# Patient Record
Sex: FEMALE | Race: BLACK OR AFRICAN AMERICAN
[De-identification: names, ages, dates, MRNs, and addresses within clinical notes are randomized per-mention and may not be internally consistent; named-entity substitution may affect disease eponyms.]

---

## 2020-10-15 ENCOUNTER — HOSPITAL ENCOUNTER (INPATIENT)
Dept: HOSPITAL 74 - J2C | Age: 35
LOS: 2 days | Discharge: HOME | DRG: 743 | End: 2020-10-17
Attending: OBSTETRICS & GYNECOLOGY | Admitting: OBSTETRICS & GYNECOLOGY
Payer: COMMERCIAL

## 2020-10-15 VITALS — BODY MASS INDEX: 28.1 KG/M2

## 2020-10-15 DIAGNOSIS — D25.9: Primary | ICD-10-CM

## 2020-10-15 LAB
ANION GAP SERPL CALC-SCNC: 7 MMOL/L (ref 8–16)
BUN SERPL-MCNC: 10.1 MG/DL (ref 7–18)
CALCIUM SERPL-MCNC: 8.4 MG/DL (ref 8.5–10.1)
CHLORIDE SERPL-SCNC: 106 MMOL/L (ref 98–107)
CO2 SERPL-SCNC: 26 MMOL/L (ref 21–32)
CREAT SERPL-MCNC: 0.8 MG/DL (ref 0.55–1.3)
DEPRECATED RDW RBC AUTO: 13.7 % (ref 11.6–15.6)
GLUCOSE SERPL-MCNC: 107 MG/DL (ref 74–106)
HCT VFR BLD CALC: 34.2 % (ref 32.4–45.2)
HGB BLD-MCNC: 11.5 GM/DL (ref 10.7–15.3)
MCH RBC QN AUTO: 32.3 PG (ref 25.7–33.7)
MCHC RBC AUTO-ENTMCNC: 33.7 G/DL (ref 32–36)
MCV RBC: 95.8 FL (ref 80–96)
PLATELET # BLD AUTO: 211 K/MM3 (ref 134–434)
PMV BLD: 9.4 FL (ref 7.5–11.1)
POTASSIUM SERPLBLD-SCNC: 4 MMOL/L (ref 3.5–5.1)
RBC # BLD AUTO: 3.57 M/MM3 (ref 3.6–5.2)
SODIUM SERPL-SCNC: 139 MMOL/L (ref 136–145)
WBC # BLD AUTO: 8.8 K/MM3 (ref 4–10)

## 2020-10-15 PROCEDURE — 0UB90ZZ EXCISION OF UTERUS, OPEN APPROACH: ICD-10-PCS | Performed by: OBSTETRICS & GYNECOLOGY

## 2020-10-15 RX ADMIN — SIMETHICONE CHEW TAB 80 MG PRN MG: 80 TABLET ORAL at 21:12

## 2020-10-15 RX ADMIN — SODIUM CHLORIDE, POTASSIUM CHLORIDE, SODIUM LACTATE AND CALCIUM CHLORIDE SCH MLS/HR: 600; 310; 30; 20 INJECTION, SOLUTION INTRAVENOUS at 21:15

## 2020-10-15 RX ADMIN — CEFAZOLIN SODIUM SCH MLS/HR: 1 INJECTION, SOLUTION INTRAVENOUS at 18:42

## 2020-10-15 RX ADMIN — IBUPROFEN PRN MG: 800 INJECTION INTRAVENOUS at 16:19

## 2020-10-15 RX ADMIN — SODIUM CHLORIDE, POTASSIUM CHLORIDE, SODIUM LACTATE AND CALCIUM CHLORIDE SCH MLS/HR: 600; 310; 30; 20 INJECTION, SOLUTION INTRAVENOUS at 13:00

## 2020-10-15 NOTE — OP
Operative Note





- Note:


Operative Date: 10/15/20


Pre-Operative Diagnosis: Leiomyomatous Uterus.  Abdominal Pain


Operation: Abdominal myomectomy.  Enterolysis


Post-Operative Diagnosis: Same as Pre-op


Surgeon: Luz Rausch


Assistant: Mario Blankenship


Anesthesia: General


Specimens Removed: 12 cm myoma.  2 x 3 cm.  1 x 4 cm.  1x 5 cm


Estimated Blood Loss (mls): 200


Operative Report Dictated: Yes

## 2020-10-16 LAB
ANION GAP SERPL CALC-SCNC: 8 MMOL/L (ref 8–16)
BUN SERPL-MCNC: 8.2 MG/DL (ref 7–18)
CALCIUM SERPL-MCNC: 8.2 MG/DL (ref 8.5–10.1)
CHLORIDE SERPL-SCNC: 108 MMOL/L (ref 98–107)
CO2 SERPL-SCNC: 26 MMOL/L (ref 21–32)
CREAT SERPL-MCNC: 0.7 MG/DL (ref 0.55–1.3)
DEPRECATED RDW RBC AUTO: 14 % (ref 11.6–15.6)
GLUCOSE SERPL-MCNC: 88 MG/DL (ref 74–106)
HCT VFR BLD CALC: 31 % (ref 32.4–45.2)
HGB BLD-MCNC: 10.4 GM/DL (ref 10.7–15.3)
MCH RBC QN AUTO: 32.1 PG (ref 25.7–33.7)
MCHC RBC AUTO-ENTMCNC: 33.7 G/DL (ref 32–36)
MCV RBC: 95.1 FL (ref 80–96)
PLATELET # BLD AUTO: 179 K/MM3 (ref 134–434)
PMV BLD: 9.6 FL (ref 7.5–11.1)
POTASSIUM SERPLBLD-SCNC: 3.5 MMOL/L (ref 3.5–5.1)
RBC # BLD AUTO: 3.25 M/MM3 (ref 3.6–5.2)
SODIUM SERPL-SCNC: 141 MMOL/L (ref 136–145)
WBC # BLD AUTO: 6.6 K/MM3 (ref 4–10)

## 2020-10-16 RX ADMIN — ACETAMINOPHEN SCH MG: 325 TABLET ORAL at 18:36

## 2020-10-16 RX ADMIN — SIMETHICONE CHEW TAB 80 MG PRN MG: 80 TABLET ORAL at 06:04

## 2020-10-16 RX ADMIN — CEFAZOLIN SODIUM SCH MLS/HR: 1 INJECTION, SOLUTION INTRAVENOUS at 10:36

## 2020-10-16 RX ADMIN — ACETAMINOPHEN SCH MG: 325 TABLET ORAL at 10:30

## 2020-10-16 RX ADMIN — ACETAMINOPHEN SCH MG: 325 TABLET ORAL at 14:35

## 2020-10-16 RX ADMIN — SIMETHICONE CHEW TAB 80 MG PRN MG: 80 TABLET ORAL at 18:38

## 2020-10-16 RX ADMIN — IBUPROFEN PRN MG: 800 INJECTION INTRAVENOUS at 00:45

## 2020-10-16 RX ADMIN — ACETAMINOPHEN SCH MG: 325 TABLET ORAL at 22:18

## 2020-10-16 RX ADMIN — ENOXAPARIN SODIUM SCH MG: 40 INJECTION SUBCUTANEOUS at 10:31

## 2020-10-16 RX ADMIN — CEFAZOLIN SODIUM SCH MLS/HR: 1 INJECTION, SOLUTION INTRAVENOUS at 03:00

## 2020-10-16 NOTE — PN
Progress Note (short form)





- Note


Progress Note: 





Surgery:





Pt seen this am, Just medicated with oral oxycodone and resting. No nausea or 

emesis. Having some scant vaginal bleeding. 





                                   Vital Signs











 Period  Temp  Pulse  Resp  BP Sys/Schneider  Pulse Ox


 


 Last 24 Hr  98.1 F-100.0 F    16-20  112-185/  








Goode:1150 celar/yellow urine





GEN: A&0x3, NAD


CV;RR, mild tachycardia


Lungs: CTA b/l


ABD: soft, non-distended, inc tenderness. Dressing c/d/i.


LE: no calf tenderness or swelling noted b/l





                                    CBC, BMP





                                 10/16/20 07:09 


                                        


                                 10/16/20 07:09 











A/P: 36 yo female s/p Abdominal myomectomy, POD#1


   continue clears


   OOB and ambulate


   DVT ppx wit lovenox SQ and SCDs


   incentive spirometer


   D/w Dr Rausch

## 2020-10-17 VITALS — DIASTOLIC BLOOD PRESSURE: 91 MMHG | TEMPERATURE: 99.6 F | HEART RATE: 100 BPM | SYSTOLIC BLOOD PRESSURE: 121 MMHG

## 2020-10-17 LAB
BASOPHILS # BLD: 0.7 % (ref 0–2)
DEPRECATED RDW RBC AUTO: 13.8 % (ref 11.6–15.6)
EOSINOPHIL # BLD: 2.4 % (ref 0–4.5)
HCT VFR BLD CALC: 35.1 % (ref 32.4–45.2)
HGB BLD-MCNC: 11.7 GM/DL (ref 10.7–15.3)
LYMPHOCYTES # BLD: 19.5 % (ref 8–40)
MCH RBC QN AUTO: 32 PG (ref 25.7–33.7)
MCHC RBC AUTO-ENTMCNC: 33.2 G/DL (ref 32–36)
MCV RBC: 96.3 FL (ref 80–96)
MONOCYTES # BLD AUTO: 6.5 % (ref 3.8–10.2)
NEUTROPHILS # BLD: 70.9 % (ref 42.8–82.8)
PLATELET # BLD AUTO: 207 K/MM3 (ref 134–434)
PMV BLD: 10.1 FL (ref 7.5–11.1)
RBC # BLD AUTO: 3.65 M/MM3 (ref 3.6–5.2)
WBC # BLD AUTO: 6.1 K/MM3 (ref 4–10)

## 2020-10-17 RX ADMIN — ENOXAPARIN SODIUM SCH MG: 40 INJECTION SUBCUTANEOUS at 11:14

## 2020-10-17 RX ADMIN — IBUPROFEN PRN MG: 800 INJECTION INTRAVENOUS at 14:41

## 2020-10-19 NOTE — PATH
Surgical Pathology Report



Patient Name:  ABILIO FIGUEROA

Accession #:  [id number]

Med. Rec. #:  [id number]                                                        

   /Age/Gender:  1985 (Age: 35) / F

Account:  [id number]                                                          

             Location: [address] [place] [address]/[facility]

Taken:  10/15/2020

Received:  10/15/2020

Reported:  10/19/2020

Physicians:  Luz Rausch M.D.

  



Specimen(s) Received

 FIBROIDS 





Clinical History

Fibroids







Final Diagnosis

FIBROIDS, ABDOMINAL MYOMECTOMY:

989 G, LEIOMYOMA(TA).

PORTION OF FIBROMUSCULAR TISSUE WITH FOCAL MILD HEMORRHAGE.

SEE COMMENT.



Comment: Suggest clinical correlation.







***Electronically Signed***

Daniella Smith M.D.





Gross Description

Received in formalin labeled "fibroids" is a 989 g aggregate of multiple, tan,

rubbery nodules consistent with fibroids and fibromuscular tissue ranging in

size from 3 -10 cm in greatest dimension. Sectioning of the fibroids, reveal a

tan, rubbery parenchyma with whorled architecture. No areas of hemorrhage or

necrosis are identified. A separate fragment of fibromuscular tissue measuring 9

x 8 x 1.5 cm, displays a focal area of hemorrhage. Representative sections are

submitted in 7 cassettes as follows: 1-4- largest fibroids; 5-6- smaller

fibroids; 7- fibromuscular tissue with hemorrhage.

MLSZ/10/16/2020



sanml/10/16/2020

## 2020-10-21 ENCOUNTER — HOSPITAL ENCOUNTER (EMERGENCY)
Dept: HOSPITAL 74 - JER | Age: 35
Discharge: HOME | End: 2020-10-21
Payer: COMMERCIAL

## 2020-10-21 VITALS — HEART RATE: 74 BPM | DIASTOLIC BLOOD PRESSURE: 79 MMHG | TEMPERATURE: 97.8 F | SYSTOLIC BLOOD PRESSURE: 130 MMHG

## 2020-10-21 VITALS — BODY MASS INDEX: 28.3 KG/M2

## 2020-10-21 DIAGNOSIS — R10.31: Primary | ICD-10-CM

## 2020-10-21 LAB
ALBUMIN SERPL-MCNC: 3.3 G/DL (ref 3.4–5)
ALP SERPL-CCNC: 52 U/L (ref 45–117)
ALT SERPL-CCNC: 60 U/L (ref 13–61)
ANION GAP SERPL CALC-SCNC: 7 MMOL/L (ref 8–16)
APPEARANCE UR: CLEAR
APTT BLD: 30.7 SECONDS (ref 25.2–36.5)
AST SERPL-CCNC: 42 U/L (ref 15–37)
BASOPHILS # BLD: 1.2 % (ref 0–2)
BILIRUB SERPL-MCNC: 0.6 MG/DL (ref 0.2–1)
BILIRUB UR STRIP.AUTO-MCNC: NEGATIVE MG/DL
BUN SERPL-MCNC: 22.2 MG/DL (ref 7–18)
CALCIUM SERPL-MCNC: 9.3 MG/DL (ref 8.5–10.1)
CHLORIDE SERPL-SCNC: 105 MMOL/L (ref 98–107)
CO2 SERPL-SCNC: 27 MMOL/L (ref 21–32)
COLOR UR: YELLOW
CREAT SERPL-MCNC: 0.8 MG/DL (ref 0.55–1.3)
DEPRECATED RDW RBC AUTO: 13.8 % (ref 11.6–15.6)
EOSINOPHIL # BLD: 3.1 % (ref 0–4.5)
GLUCOSE SERPL-MCNC: 77 MG/DL (ref 74–106)
HCT VFR BLD CALC: 35.1 % (ref 32.4–45.2)
HGB BLD-MCNC: 11.6 GM/DL (ref 10.7–15.3)
INR BLD: 1.16 (ref 0.83–1.09)
KETONES UR QL STRIP: NEGATIVE
LEUKOCYTE ESTERASE UR QL STRIP.AUTO: NEGATIVE
LIPASE SERPL-CCNC: 57 U/L (ref 73–393)
LYMPHOCYTES # BLD: 25.9 % (ref 8–40)
MAGNESIUM SERPL-MCNC: 2.3 MG/DL (ref 1.8–2.4)
MCH RBC QN AUTO: 31.3 PG (ref 25.7–33.7)
MCHC RBC AUTO-ENTMCNC: 33 G/DL (ref 32–36)
MCV RBC: 94.9 FL (ref 80–96)
MONOCYTES # BLD AUTO: 6.4 % (ref 3.8–10.2)
NEUTROPHILS # BLD: 63.4 % (ref 42.8–82.8)
NITRITE UR QL STRIP: NEGATIVE
PH UR: 6 [PH] (ref 5–8)
PLATELET # BLD AUTO: 295 K/MM3 (ref 134–434)
PMV BLD: 8.6 FL (ref 7.5–11.1)
POTASSIUM SERPLBLD-SCNC: 4.3 MMOL/L (ref 3.5–5.1)
PROT SERPL-MCNC: 7.2 G/DL (ref 6.4–8.2)
PROT UR QL STRIP: NEGATIVE
PROT UR QL STRIP: NEGATIVE
PT PNL PPP: 14 SEC (ref 9.7–13)
RBC # BLD AUTO: 3.71 M/MM3 (ref 3.6–5.2)
SODIUM SERPL-SCNC: 139 MMOL/L (ref 136–145)
SP GR UR: 1.03 (ref 1.01–1.03)
UROBILINOGEN UR STRIP-MCNC: 1 MG/DL (ref 0.2–1)
WBC # BLD AUTO: 5.2 K/MM3 (ref 4–10)

## 2020-10-21 PROCEDURE — 3E0337Z INTRODUCTION OF ELECTROLYTIC AND WATER BALANCE SUBSTANCE INTO PERIPHERAL VEIN, PERCUTANEOUS APPROACH: ICD-10-PCS

## 2020-10-21 NOTE — PDOC
History of Present Illness





- General


Chief Complaint: Pain


Stated Complaint: PAIN


Time Seen by Provider: 10/21/20 14:03





- History of Present Illness


Initial Comments: 





10/21/20 14:36


36 yo female s/p myomectomy in Oct 15 by  presenting with lower 

abdominal pain and fever for the last 6 days. Pt explains she has been having 

lower abdominal pain since her surgery and it has not changed. Pt explains since

her surgery she does not know if she had fevers the first two days but believes 

to have fevers since discharge with highest reading be 101 yesterday. Pt 

explains also having a cough and chest pain worse with cough and deep 

inspiration. Pt also has associated dysuria and urinary frequency. Pt has not 

made bowel movement since surgery but has been passing gas since 10/17. Pt 

denies hematuria and blood in vaginal canal. 





PMH: denies


Meds:oxycodone and ibuprofen


PSH: myomectomy (2x)


Allergies: denies


Social: denies smoking drugs or alcohol 


OBGYN: Dr. Rausch


PCP: Dr. Prasad ([facility])








Past History





- Medical History


Allergies/Adverse Reactions: 


                                    Allergies











Allergy/AdvReac Type Severity Reaction Status Date / Time


 


No Known Allergies Allergy   Verified 10/21/20 12:54











Home Medications: 


Ambulatory Orders





Ibuprofen [Motrin -] 600 mg PO QID #28 tablet 10/17/20 


Oxycodone HCl/Acetaminophen [Percocet 5-325 mg Tablet] 1 - 2 tab PO Q6H #20 tab 

MDD 6 10/17/20 








Anemia: No


Asthma: No


Cancer: No


Cardiac Disorders: No


CVA: No


COPD: No


CHF: No


Dementia: No


Diabetes: No


GI Disorders: No


 Disorders: No


HTN: Yes (NO MEDS-ELEVATED WITH ANXIETY)


Hypercholesterolemia: No


Liver Disease: No


Seizures: No


Thyroid Disease: No


Other medical history: fibroids





- Reproductive History


Is Patient Pregnant Now?: No





- Psycho-Social/Smoking History


Smoking History: Never smoked


Have you smoked in the past 12 months: No


Information on smoking cessation initiated: No





- Substance Abuse Hx (Audit-C & DAST Scrn)


How often the patient has a drink containing alcohol: Never


Score: In Men: 4 or > Positive; In Women: 3 or > Positive: 0


Screen Result (Pos requires Nsg. Audit-10AR): Negative


In the last yr the pt used illegal drug/Rx for NonMed reason: No


Score:  Yes response is considered Positive: 0


Screen Result (Positive result requires Nsg. DAST-10): Negative





**Review of Systems





- Review of Systems


Comments:: 





10/21/20 14:44


GENERAL/CONSTITUTIONAL: Fevers and chills 


HEAD, EYES, EARS, NOSE AND THROAT: No change in vision. No ear pain or 

discharge. No sore throat.


CARDIOVASCULAR: chest pain. no sob 


RESPIRATORY: Cough. NO wheezing, or hemoptysis.


GASTROINTESTINAL: Nausea and constipation. NO vomiting, diarrhea


GENITOURINARY: Dysuria and urinary frequency 


MUSCULOSKELETAL: No joint or muscle swelling or pain.Lower back pain 


SKIN: No rash


NEUROLOGIC: No headache, vertigo, loss of consciousness, or change in 

strength/sensation.


ENDOCRINE: No increased thirst. No abnormal weight change


ALLERGIC/IMMUNOLOGIC: No hives or skin allergy.





10/21/20 14:45








*Physical Exam





- Vital Signs


                                Last Vital Signs











Temp Pulse Resp BP Pulse Ox


 


 98.5 F   91 H  17   140/91   100 


 


 10/21/20 12:49  10/21/20 12:49  10/21/20 12:49  10/21/20 12:49  10/21/20 12:49














- Physical Exam





10/21/20 14:47


GENERAL: Awake, alert, and fully oriented, in moderate distress 


HEAD: No signs of trauma, normocephalic, atraumatic 


EYES: EOMI, sclera anicteric, conjunctiva clear


ENT: Auricles normal inspection, hearing grossly normal, nares patent, 

oropharynx clear without


exudates. Moist mucosa


NECK: Normal ROM, supple, no lymphadenopathy, JVD, or masses


LUNGS: No distress, speaks full sentences, clear to auscultation bilaterally    


HEART: Regular rate and rhythm, normal S1 and S2, no murmurs, rubs or gallops, 

peripheral pulses normal and equal bilaterally. 


ABDOMEN: Normoactive bowel sounds in all four quadrants. Healed midline surgical

scar with no erythema or puss. Tenderness to palpation in LLQ and RLQ 


EXTREMITIES : Normal inspection, Normal range of motion, no edema.  No clubbing 

or cyanosis. 


NEUROLOGICAL: Cranial nerves II through XII grossly intact.  Normal speech


SKIN: Warm, Dry, normal turgor, no rashes or lesions noted











ED Treatment Course





- LABORATORY


CBC & Chemistry Diagram: 


                                 10/21/20 13:30





                                 10/21/20 13:30





- ADDITIONAL ORDERS


Additional order review: 


                               Laboratory  Results











  10/21/20 10/21/20





  13:30 13:30


 


PT with INR   14.00 H


 


INR   1.16 H


 


PTT (Actin FS)   30.7


 


Sodium  139 


 


Potassium  4.3 


 


Chloride  105 


 


Carbon Dioxide  27 


 


Anion Gap  7 L 


 


BUN  22.2 H 


 


Creatinine  0.8 


 


Est GFR (CKD-EPI)AfAm  109.93 


 


Est GFR (CKD-EPI)NonAf  94.85 


 


Random Glucose  77 


 


Calcium  9.3 


 


Magnesium  2.3 


 


Total Bilirubin  0.6 


 


AST  42 H 


 


ALT  60 


 


Alkaline Phosphatase  52 


 


Total Protein  7.2 


 


Albumin  3.3 L 


 


Lipase  57 L 








                                        











  10/21/20





  13:30


 


RBC  3.71


 


MCV  94.9


 


MCHC  33.0


 


RDW  13.8


 


MPV  8.6  D


 


Neutrophils %  63.4


 


Lymphocytes %  25.9  D


 


Monocytes %  6.4


 


Eosinophils %  3.1


 


Basophils %  1.2














- Medications


Given in the ED: 


ED Medications














Discontinued Medications














Generic Name Dose Route Start Last Admin





  Trade Name Freq  PRN Reason Stop Dose Admin


 


Lactated Ringer's  1,000 mls @ 1,000 mls/hr  10/21/20 13:13  10/21/20 13:30





  Lactated Ringers Solution  IV  10/21/20 14:12  1,000 mls/hr





  ONCE STA   Administration














Medical Decision Making





- Medical Decision Making





10/21/20 14:49


36 yo s/p myomectomy presents to ED for fever and lower abd pain. Pt also has 

dysuria, urinary frequency, and nonproductive cough.





Assesment: s/p surgery infection: PNA, atelectasis, GI abscess, ileus, uterine 

infection, UTI


PLAN:


- cxr


- cbc, cmp, coags, type and screen, UA, Urine culture 


- CT scan of abd and pelvis with IV contrast


10/21/20 16:11


- Labs are unremarkable


- awaiting CT scan results. 


10/21/20 18:15


CT scan of abd/pelvis: mild thickening of the rectus abdominous muscles at the 

level of the pelvis which could be on a postsurgical basis. Several small 

pockets of aire are also noted within the rectus sheats bilaterally. Mild soft 

tissue strandisg a well as trace fluid accumulation. is noted adjacent to the 

uterine fundus ventrally. A small amount of free intraperitooneal fluid is 

overall size demonstrating multiple low attenuation intramural and subserosal 

lesions probably representing leiomyomas. 





Dr. Rausch was consulted with CT scan results. Dr. Rausch was told about 

ER course and she advised pt can go home and follow up with her in the morning. 


10/21/20 18:18





10/21/20 18:42


Pt still no fever. Pt will be discharged with GI follow up. OBGYN follow up. Pt 

CXR was negative.





Discharge





- Discharge Information


Problems reviewed: Yes


Clinical Impression/Diagnosis: 


Abdominal pain


Qualifiers:


 Abdominal location: right lower quadrant Qualified Code(s): R10.31 - Right 

lower quadrant pain





Condition: Good


Disposition: HOME





- Follow up/Referral


Referrals: 


Luz Rausch MD [Staff Physician] - 


Oswaldo Gil MD [Staff Physician] - 





- Patient Discharge Instructions


Patient Printed Discharge Instructions:  Myomectomy -- Open Surgery


Additional Instructions: 


You came to the ED for abdominal pain and fever. This is most likely due to your

most recent surgery. 





At the ER we did labs, imaging, and took your vitals. At the ER you did not have

a fever, but we gave you fluids. We also did imaging which were negative for any

acute pathologies. We also did labs which were within normal limits. The imaging

did show a .5cm right hepatic lobe hypodense focus which should be followed up 

with a gastroenterologist within the next 3 months. 


For your fever and pain please follow up with your OBGYN tomorrow. 





For your fever and pain please continue taking your oxycodone as needed. IF you 

run out of your oxycodone please take 325-650mg tylenol every 6 hours when 

needed.





Please come back:


- worsening abdominal pain


- worsening fever


- unable to pass bowel movements after 14 days.


For any emergent symptoms please call for medical help right away.  





- Post Discharge Activity

## 2020-10-21 NOTE — XMS
Summarization Of Episode

                           Created on:2020



Patient:ABILIO FIGUEROA

Sex:Female

:1985

External Reference #:[id number]





Demographics







                          [place] [address]

 

                          Home Phone                [phone]

 

                          Work Phone                [phone]

 

                          Preferred Language        English

 

                          Marital Status            Unknown

 

                          Latter-day Affiliation     CH

 

                          Race                      BL

 

                          Ethnic Group              Unknown









Author







                          Organization              [facility]









Support







                Name            Relationship    Address         Phone

 

                SUZANNE JONES    PARTNER          [facility] [phone]



                                                [place] [address] 

 

                [facility] Unavailable     [address] [phone]



                                                [place] [address] 

 

                SAIDA DONIS MOTHER           [facility] [phone]



                                                [place] [address] 

 

                SAIDA DONIS Mother           [facility] Unavailab

le



                                                [place] [address] 









Re-disclosure Warning




Insurance Providers







          Payer name Policy type Policy ID Covered   Covered party's Policy    P

arian



                    / Coverage           party ID  relationship to [employer]    Inf

ormation



                    type                          emerson              

 

          LOCAL [id number] -           [id number]                             [id number]

[id number]



          [facility]                                                        







Results







                    ID                  Date                Data Source

 

                    [id number]         10/11/2020 11:50:00 AM EDT [facility]











          Name      Value     Range     Interpretation Description Data      Sup

porting



                                        Code                Source(s) Document(s

)

 

          SARS                                              [facility]   



          coronavirus 2                                                   



          RNA                                                         









                                        This lab was ordered by [facility] and reported by [facility].











                                        Procedure

## 2020-10-21 NOTE — OP
DATE OF OPERATION:  10/15/2020

 

PREOPERATIVE DIAGNOSES:  

1.  Leiomyomatous uterus.

2.  Abdominal pain. 

 

OPERATION:  Abdominal myomectomy and enterolysis.

 

POSTOPERATIVE DIAGNOSIS:  Leiomyomatous uterus.

 

SURGEON:  Rakan Rausch MD

 

ASSISTANT:  Mario Blankenship MD

 

ANESTHESIA:  General.

 

FINDINGS:  A 12-cm myoma removed, two 3 cm fibroids, one 4 cm fibroid, and one 5 cm

fibroid removed.

 

ESTIMATED BLOOD LOSS:  About 200 mL.

 

PROCEDURE:  The patient was taken to the operating room, placed in the supine

position, and prepped and draped in the usual sterile fashion.  A Pfannenstiel skin

incision was made with a scalpel.  Cautery was then used to go through the layers of

the abdominal wall to the level of the fascia.  The fascia was cut in midline and

cautery was then used to open the fascia in a smiling fashion.  Kocher was then used

to bluntly and sharply dissect the rectus muscle off the fascia.  Muscle was split in

the midline and the peritoneal cavity was then entered.  The rectus muscle was then

transversely cut with the cautery.  A large 17-cm uterus was exteriorized.  Of note

in the fundal area was a 12-cm myoma with some small myomas throughout the uterine

cavity.  Tourniquet was placed in the clear space of the broad ligament.  Some

adhesions were noted, bowel adhesions to the uterus and sidewall and Dr. Blankenship was

able to perform enterolysis and remove the adhesions off the uterus and the sidewall.

 A circumferential incision was made after the Pitressin was infiltrated and the

tourniquet placed.  A large 12-cm myoma was removed.  Using blunt and sharp technique

the 12 cm myoma was enucleated out.  Smaller fibroids were also noted.  Two 3 cm

myomas were removed through other incisions anteriorly as well as one 4 cm and one 5

cm myoma removed using blunt and sharp technique.  After Pitressin was infiltrated

incisions were made and those myomas were removed.  The uterus was then closed in

layers using 0 Vicryl as well as V-Loc suture on the serosa and hemostasis was

achieved in all of the incisions   After hemostasis was achieved the tourniquet was

removed, coagulation of the holes were then done.  The uterus was then interiorized. 

The abdominal cavity was cleaned with clean lap pads.  The peritoneum was then closed

using 0- Vicryl in a continuous stitch.  The muscles were approximated using a purse

string stitch using 0- Vicryl suture.  The fascia was then closed using 0 Vicryl

suture.  Subcutaneous tissue was closed using a 2-0 Vicryl and the skin was then

closed using 3-0 Vicryl in a subcuticular fashion.  Wound was washed and dressed. 

The patient tolerated the procedure well.  Prior to internalizing the uterus

Interceed was placed and sewn onto the incisions to prevent adhesions.  The wound was

washed and dressed.  The patient tolerated the procedure and was taken to the

recovery room in stable condition.  

 

 

RAKAN RAUSCH M.D.

 

[id number]

DD: 10/21/2020 13:54

DT: 10/21/2020 14:21

Job #:  [id number]

## 2020-10-21 NOTE — PDOC
Documentation entered by Venancio Troncoso SCRIBE, acting as scribe for 

Bear Okeefe MD.








Bear Okeefe MD:  This documentation has been prepared by the Karyna aguilera Xhesika, SCRIBE, under my direction and personally reviewed by me in its 

entirety.  I confirm that the documentation accurately reflects all work, 

treatment, procedures, and medical decision making performed by me.  





Attending Attestation





- Resident


Resident Name: NicilJose J





- ED Attending Attestation


I have performed the following: I have examined & evaluated the patient, The 

case was reviewed & discussed with the resident, I agree w/resident's findings &

plan, Exceptions are as noted





- HPI


HPI: 





10/21/20 13:14


36y/o F with a PMH of s/p Abdominal myomectomy (10/15/20 with Dr. Rausch) who

presents to the ED for fever (Tmax 101 yesterday) and lower abdominal pain since

her surgery. Pt state she has not had a BM since surgery.


The patient denies chest pain, shortness of breath, headache and dizziness. 

Denies cough, nausea, vomiting, diarrhea and constipation. Denies dysuria, 

frequency, urgency and hematuria.





Allergies: NKDA








- Physicial Exam


PE: 





10/21/20 15:57





EXAMINATION 





CONSTITUTIONAL: Well-appearing; well-nourished; in no apparent distress





EYES: PERRL; EOM intact 


ENMT: External appears normal; normal oropharynx


CARD: Normal S1, S2; no murmurs, rubs, or gallops


RESP: Normal chest excursion with respiration; breath sounds clear and equal 

bilaterally; no wheezes, rhonchi, or rales


ABD: Soft, non-distended; +  rlq/suprapubic/llq ttp; no g/r; tender; no palpable

organomegaly, no palpable hernias; no cva ttp; = vertical infraumbilcal 

incision-well appearing.


EXT: Normal ROM in all four extremities; non-tender to palpation; distal pulses 

intact














- Medical Decision Making





10/21/20 15:58


35-year-old female with history of leiomyomas, status post open myomectomy  days

previously presents with lower abdominal pain and fever (T-max of 101) for the 

past 24 to 36 hours.  In the ER, patient is nontoxic-appearing, afebrile.  Vital

signs are noted.  Abdominal exam reveals diffuse lower abdominal tenderness to 

palpation without guarding or rebound.  Will obtain chest x-ray to rule out 

pneumonia.  Will obtain CBC/CMP/blood culture/urine culture.  Will obtain CT of 

abdomen pelvis with IV contrast to evaluate for intra-abdominal collection.  

Will consult OB/GYN.  Will reassess.





Discharge





- Discharge Information


Problems reviewed: Yes


Clinical Impression/Diagnosis: 


Abdominal pain


Qualifiers:


 Abdominal location: right lower quadrant Qualified Code(s): R10.31 - Right 

lower quadrant pain








- Follow up/Referral





- Patient Discharge Instructions





- Post Discharge Activity

## 2020-10-22 LAB — PHOSPHATE SERPL-MCNC: 5.1 MG/DL (ref 2.5–4.9)

## 2023-07-28 ENCOUNTER — HOSPITAL ENCOUNTER (OUTPATIENT)
Dept: HOSPITAL 74 - JRADIR | Age: 38
Discharge: HOME | End: 2023-07-28
Attending: INTERNAL MEDICINE
Payer: COMMERCIAL

## 2023-07-28 DIAGNOSIS — E04.1: Primary | ICD-10-CM

## 2023-07-28 PROCEDURE — 0G9H3ZX DRAINAGE OF RIGHT THYROID GLAND LOBE, PERCUTANEOUS APPROACH, DIAGNOSTIC: ICD-10-PCS | Performed by: RADIOLOGY

## 2025-03-10 ENCOUNTER — HOSPITAL ENCOUNTER (OUTPATIENT)
Dept: HOSPITAL 74 - JRADIR | Age: 40
Discharge: HOME | End: 2025-03-10
Attending: INTERNAL MEDICINE
Payer: COMMERCIAL

## 2025-03-10 DIAGNOSIS — E04.1: Primary | ICD-10-CM

## 2025-03-10 PROCEDURE — 0GBH3ZX EXCISION OF RIGHT THYROID GLAND LOBE, PERCUTANEOUS APPROACH, DIAGNOSTIC: ICD-10-PCS | Performed by: RADIOLOGY
